# Patient Record
(demographics unavailable — no encounter records)

---

## 2024-10-18 NOTE — HISTORY OF PRESENT ILLNESS
[TextBox_4] : 69 yo F PMHx of Seasonal Allergies, HTN, DM, CKD, NEAL, Gout endorses TESFAYE since [place] COVID-19 in 2021 (was never hospitalized), was seen by a pulmonologist 2 years ago who prescribed her Albuterol as needed, which she uses once a month with minimal relief. Patient also endorses PND, is a retired teacher, never smoker. 2 weeks ago had URI symptoms as she lives with her grandkids one of whom had coxsackie virus.

## 2024-10-18 NOTE — ASSESSMENT
[FreeTextEntry1] : SOB TESFAYE Recent URI 2 weeks prior Post viral RADS Never Smoker HO COVID-19 2021

## 2025-03-19 NOTE — ASSESSMENT
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

## 2025-03-19 NOTE — HISTORY OF PRESENT ILLNESS
[FreeTextEntry1] : Cardio: Dr. Rodríguez Nephro: Dr. Sahu  68-year-old female presenting for evaluation of cardiomyopathy. She was admitted to [facility] 12/19/2024-12/22/2024 for CHF and found to have an EF 14% and dilated LV. She was started on GDMT and discharged with a wearable defibrillator. Ischemic work-up deferred at that time due to DANAY on CKD. She had been doing well, denies LE swelling, TESFAYE, chest pain, WCD shocks or syncope. She is scheduled for her TTE with Dr. Rodríguez end of March 2025.   -EKG: Sinus rhythm at 77 bpm, PVCs -TTE: 12/9/2024  1. Severely decreased global left ventricular systolic function.  2. LV Ejection Fraction by Lebron's Method with a biplane EF of 14 %.  3. Mild eccentric left ventricular hypertrophy.  4. Spectral Doppler shows pseudonormal pattern of left ventricular myocardial filling (Grade II diastolic dysfunction).  5. Normal right ventricular size and function.  6. Mildly enlarged left atrium. LA volume Index is 36.5 ml/m.  7. Normal right atrial size.  8. Moderate mitral annular calcification.  9. Mild thickening and calcification of the anterior and posterior mitral valve leaflets. 10. Moderate mitral valve regurgitation. 11. Mild tricuspid regurgitation. 12. Normal pulmonary artery pressure. 13. There is no evidence of pericardial effusion.

## 2025-04-16 NOTE — HISTORY OF PRESENT ILLNESS
[FreeTextEntry1] : Cardio: Dr. Rodríguez Nephro: Dr. Sahu  68-year-old female presenting for evaluation of cardiomyopathy. She was admitted to [facility] 12/19/2024-12/22/2024 for CHF and found to have an EF 14% and dilated LV. She was started on GDMT and discharged with a wearable defibrillator. Ischemic work-up deferred at that time due to DANAY on CKD. She had been doing well, denies LE swelling, TESFAYE, chest pain, WCD shocks or syncope. She is scheduled for her TTE with Dr. Rodríguez end of March 2025.   4/16/2025: She returns today for a follow-up after TTE. EF has improved on GDMT, according to Dr. Rodríguez EF is 45-50%. She still reports occasional palpitations, once every 7-10 days lasting for minutes to an hour. Denies dizziness or syncope.   -EKG 4/16/2025: SR at 74 bpm -EKG: Sinus rhythm at 77 bpm, PVCs  -TTE: 12/9/2024  1. Severely decreased global left ventricular systolic function.  2. LV Ejection Fraction by Lebron's Method with a biplane EF of 14 %.  3. Mild eccentric left ventricular hypertrophy.  4. Spectral Doppler shows pseudonormal pattern of left ventricular myocardial filling (Grade II diastolic dysfunction).  5. Normal right ventricular size and function.  6. Mildly enlarged left atrium. LA volume Index is 36.5 ml/m.  7. Normal right atrial size.  8. Moderate mitral annular calcification.  9. Mild thickening and calcification of the anterior and posterior mitral valve leaflets. 10. Moderate mitral valve regurgitation. 11. Mild tricuspid regurgitation. 12. Normal pulmonary artery pressure. 13. There is no evidence of pericardial effusion.  -TTE 3/25/2025 EF 50-55%, mild concentric hypertrophy, moderate eccentric MV regurg, normal LA size

## 2025-04-16 NOTE — ASSESSMENT
[FreeTextEntry1] : 68-year-old female with CKD (Cr 2.3), cardiomyopathy, HFrEF, HTN. EF improved on GDMT to >35%. She can discontinue WCD. BP elevated today in the office. She is asymptomatic. Patient instructed to take an extra tablet of isosorbide mononitrate ER 30 mg today as soon as she gets home. She has intermitted palpitations.   Plan: Continue GDMT Increase Entresto to 49-51 mg once twice daily BMP and Mg in 1 week MCOT for 4 weeks Follow-up in 6 weeks Nephrology and cardiology follow-up

## 2025-05-28 NOTE — HISTORY OF PRESENT ILLNESS
[FreeTextEntry1] : Cardio: Dr. Rodríguez Nephro: Dr. Sahu  68-year-old female presenting for evaluation of cardiomyopathy. She was admitted to [facility] 12/19/2024-12/22/2024 for CHF and found to have an EF 14% and dilated LV. She was started on GDMT and discharged with a wearable defibrillator. Ischemic work-up deferred at that time due to DANAY on CKD. She had been doing well, denies LE swelling, TESFAYE, chest pain, WCD shocks or syncope. She is scheduled for her TTE with Dr. Rodríguez end of March 2025.   4/16/2025: She returns today for a follow-up after TTE. EF has improved on GDMT, according to Dr. Rodríguez EF is 45-50%. She still reports occasional palpitations, once every 7-10 days lasting for minutes to an hour. Denies dizziness or syncope.   5/28/2025: She returns to follow-up on MCOT. Her palpitations have decreased in frequency and duration. Denies syncope, dizziness, chest pain, shortness of breath.  -EKG 5/28/2025: SR with frequent PVC (RBBB, Inferior axis, AL pap?) -EKG 4/16/2025: SR at 74 bpm -EKG: Sinus rhythm at 77 bpm, PVCs  -TTE: 12/9/2024  1. Severely decreased global left ventricular systolic function.  2. LV Ejection Fraction by Lebron's Method with a biplane EF of 14 %.  3. Mild eccentric left ventricular hypertrophy.  4. Spectral Doppler shows pseudonormal pattern of left ventricular myocardial filling (Grade II diastolic dysfunction).  5. Normal right ventricular size and function.  6. Mildly enlarged left atrium. LA volume Index is 36.5 ml/m.  7. Normal right atrial size.  8. Moderate mitral annular calcification.  9. Mild thickening and calcification of the anterior and posterior mitral valve leaflets. 10. Moderate mitral valve regurgitation. 11. Mild tricuspid regurgitation. 12. Normal pulmonary artery pressure. 13. There is no evidence of pericardial effusion.  -TTE 3/25/2025 EF 50-55%, mild concentric hypertrophy, moderate eccentric MV regurg, normal LA size  -MCOT 4/16/2025-5/14/2025: Sinus rhythm 53/73/121; frequent PVCs (6.17%), pSVT, IVCD, NSVT (longest 7 beats).

## 2025-05-28 NOTE — ASSESSMENT
[FreeTextEntry1] : 68-year-old female with CKD (Cr 2.3), cardiomyopathy, HFrEF, HTN. EF improved on GDMT to >35%. She can discontinue WCD. BP elevated today in the office. She is asymptomatic. MCOT with NSVTs (longest 7 beats), 6% PVC burden. Ischemic work-up with LHC deferred for now per cardiology due to CKD. She still has occasional palpitations, denies syncope.   Plan: cMRI for NSVT and to evaluate for LGE and scar pattern Increase metoprolol 100 mg once daily Follow-up after cMRI  Will need ischemic work-up eventually